# Patient Record
Sex: MALE | Race: WHITE | NOT HISPANIC OR LATINO | URBAN - METROPOLITAN AREA
[De-identification: names, ages, dates, MRNs, and addresses within clinical notes are randomized per-mention and may not be internally consistent; named-entity substitution may affect disease eponyms.]

---

## 2020-04-02 DIAGNOSIS — N20.0 URINARY TRACT OBSTRUCTION BY KIDNEY STONE: Primary | ICD-10-CM

## 2020-04-02 DIAGNOSIS — N20.0 RENAL CALCULI: ICD-10-CM

## 2020-04-02 DIAGNOSIS — N13.8 URINARY TRACT OBSTRUCTION BY KIDNEY STONE: Primary | ICD-10-CM

## 2020-04-06 ENCOUNTER — TELEPHONE (OUTPATIENT)
Dept: UROLOGY | Facility: CLINIC | Age: 55
End: 2020-04-06

## 2020-04-06 NOTE — TELEPHONE ENCOUNTER
----- Message from Endy Jade sent at 4/6/2020 10:33 AM CDT -----  Contact: Pt  Rony called regarding his CT scan and if he should wait to get it due to the virus situation. Please call him to discuss this [phone] (home).

## 2022-04-28 NOTE — TELEPHONE ENCOUNTER
Pt stated he wants to wait till virus settles down to go to CT. Informed pt that was his decision and fine with us we understand. He will r/s when he is comfortable to go again. HM   Labs/EKG/Medications